# Patient Record
Sex: MALE | Race: BLACK OR AFRICAN AMERICAN | URBAN - METROPOLITAN AREA
[De-identification: names, ages, dates, MRNs, and addresses within clinical notes are randomized per-mention and may not be internally consistent; named-entity substitution may affect disease eponyms.]

---

## 2023-02-15 ENCOUNTER — OFFICE VISIT (OUTPATIENT)
Dept: URBAN - METROPOLITAN AREA CLINIC 25 | Facility: CLINIC | Age: 48
End: 2023-02-15
Payer: COMMERCIAL

## 2023-02-15 ENCOUNTER — WEB ENCOUNTER (OUTPATIENT)
Dept: URBAN - METROPOLITAN AREA CLINIC 25 | Facility: CLINIC | Age: 48
End: 2023-02-15

## 2023-02-15 VITALS
WEIGHT: 120 LBS | HEART RATE: 72 BPM | SYSTOLIC BLOOD PRESSURE: 124 MMHG | BODY MASS INDEX: 20.49 KG/M2 | DIASTOLIC BLOOD PRESSURE: 78 MMHG | HEIGHT: 64 IN | TEMPERATURE: 97.7 F

## 2023-02-15 DIAGNOSIS — Z12.11 SCREENING COLONOSCOPY: ICD-10-CM

## 2023-02-15 DIAGNOSIS — R10.13 DYSPEPSIA: ICD-10-CM

## 2023-02-15 DIAGNOSIS — K21.9 GERD: ICD-10-CM

## 2023-02-15 PROCEDURE — 99204 OFFICE O/P NEW MOD 45 MIN: CPT | Performed by: INTERNAL MEDICINE

## 2023-02-15 RX ORDER — FAMOTIDINE 40 MG/1
1 TABLET TABLET, FILM COATED ORAL TWICE A DAY
Qty: 60 | Refills: 5 | OUTPATIENT
Start: 2023-02-15

## 2023-02-15 RX ORDER — POLYETHYLENE GLYCOL 3350, SODIUM CHLORIDE, SODIUM BICARBONATE, POTASSIUM CHLORIDE 420; 11.2; 5.72; 1.48 G/4L; G/4L; G/4L; G/4L
AS DIRECTED POWDER, FOR SOLUTION ORAL ONCE
Qty: 1 BOTTLE | Refills: 0 | OUTPATIENT
Start: 2023-02-15 | End: 2023-02-16

## 2023-02-15 NOTE — HPI-TODAY'S VISIT:
This patient was referred by Dr. Sejal Reza for an evaluation of abdominal pain/ER follow-up.  A copy of this will be sent to the referring provider.  He went to [facility] ED in 11/2022 for epigastric pain.  Labs were normal.  He was prescribed PPI daily for 1 month.  He compelted this.  He still has some pain when he eats certain foods.  He denies anorexia or weight loss.  He has occasional GERD and he has a dry throat.  He denies N/V/dysphagia/early satiety.  He denies LGI symptoms.  He denies LGI bleed or melena.  There is no FHx of colon cancer.  He has not had prior colonoscopy.

## 2023-02-22 PROBLEM — 235595009 GASTROESOPHAGEAL REFLUX DISEASE: Status: ACTIVE | Noted: 2023-02-15

## 2023-03-06 ENCOUNTER — TELEPHONE ENCOUNTER (OUTPATIENT)
Dept: URBAN - METROPOLITAN AREA CLINIC 6 | Facility: CLINIC | Age: 48
End: 2023-03-06

## 2023-03-29 ENCOUNTER — OFFICE VISIT (OUTPATIENT)
Dept: URBAN - METROPOLITAN AREA MEDICAL CENTER 8 | Facility: MEDICAL CENTER | Age: 48
End: 2023-03-29

## 2023-06-28 ENCOUNTER — OFFICE VISIT (OUTPATIENT)
Dept: URBAN - METROPOLITAN AREA MEDICAL CENTER 8 | Facility: MEDICAL CENTER | Age: 48
End: 2023-06-28

## 2023-07-05 ENCOUNTER — TELEPHONE ENCOUNTER (OUTPATIENT)
Dept: URBAN - METROPOLITAN AREA CLINIC 25 | Facility: CLINIC | Age: 48
End: 2023-07-05

## 2023-07-10 ENCOUNTER — OFFICE VISIT (OUTPATIENT)
Dept: URBAN - METROPOLITAN AREA SURGERY CENTER 20 | Facility: SURGERY CENTER | Age: 48
End: 2023-07-10

## 2023-07-26 ENCOUNTER — TELEPHONE ENCOUNTER (OUTPATIENT)
Dept: URBAN - METROPOLITAN AREA CLINIC 25 | Facility: CLINIC | Age: 48
End: 2023-07-26

## 2023-08-02 ENCOUNTER — TELEPHONE ENCOUNTER (OUTPATIENT)
Dept: URBAN - METROPOLITAN AREA CLINIC 25 | Facility: CLINIC | Age: 48
End: 2023-08-02

## 2023-08-02 ENCOUNTER — OFFICE VISIT (OUTPATIENT)
Dept: URBAN - METROPOLITAN AREA MEDICAL CENTER 8 | Facility: MEDICAL CENTER | Age: 48
End: 2023-08-02
Payer: COMMERCIAL

## 2023-08-02 DIAGNOSIS — Z12.11 COLON CANCER SCREENING: ICD-10-CM

## 2023-08-02 PROCEDURE — 45378 DIAGNOSTIC COLONOSCOPY: CPT | Performed by: INTERNAL MEDICINE

## 2023-08-02 RX ORDER — FAMOTIDINE 40 MG/1
1 TABLET TABLET, FILM COATED ORAL TWICE A DAY
Qty: 60 | Refills: 5 | Status: ACTIVE | COMMUNITY
Start: 2023-02-15

## 2023-09-20 ENCOUNTER — OFFICE VISIT (OUTPATIENT)
Dept: URBAN - METROPOLITAN AREA MEDICAL CENTER 8 | Facility: MEDICAL CENTER | Age: 48
End: 2023-09-20

## 2023-09-27 ENCOUNTER — OFFICE VISIT (OUTPATIENT)
Dept: URBAN - METROPOLITAN AREA MEDICAL CENTER 8 | Facility: MEDICAL CENTER | Age: 48
End: 2023-09-27

## 2024-02-14 ENCOUNTER — OV EP (OUTPATIENT)
Dept: URBAN - METROPOLITAN AREA CLINIC 25 | Facility: CLINIC | Age: 49
End: 2024-02-14
Payer: COMMERCIAL

## 2024-02-14 VITALS
TEMPERATURE: 97.5 F | DIASTOLIC BLOOD PRESSURE: 76 MMHG | SYSTOLIC BLOOD PRESSURE: 143 MMHG | HEIGHT: 65 IN | HEART RATE: 88 BPM | WEIGHT: 116 LBS | BODY MASS INDEX: 19.33 KG/M2

## 2024-02-14 DIAGNOSIS — R10.13 EPIGASTRIC ABDOMINAL PAIN: ICD-10-CM

## 2024-02-14 DIAGNOSIS — R68.81 EARLY SATIETY: ICD-10-CM

## 2024-02-14 DIAGNOSIS — K21.9 GERD: ICD-10-CM

## 2024-02-14 PROCEDURE — 99214 OFFICE O/P EST MOD 30 MIN: CPT | Performed by: INTERNAL MEDICINE

## 2024-02-14 RX ORDER — FAMOTIDINE 40 MG/1
1 TABLET TABLET, FILM COATED ORAL TWICE A DAY
Qty: 60 | Refills: 5 | Status: DISCONTINUED | COMMUNITY
Start: 2023-02-15

## 2024-02-14 RX ORDER — PANTOPRAZOLE 40 MG/1
TABLET, DELAYED RELEASE ORAL
Qty: 60 TABLET | Status: ACTIVE | COMMUNITY

## 2024-02-14 RX ORDER — DICYCLOMINE HYDROCHLORIDE 10 MG/1
CAPSULE ORAL
Qty: 60 CAPSULE | Status: DISCONTINUED | COMMUNITY

## 2024-02-14 NOTE — HPI-TODAY'S VISIT:
Colonoscopy a year ago was normal with a 10 year recall.  He has recurrent epiagstric abdominal pain.  His pain increases with PO intake.  The pain is burning.  The pain radiates to his chest/throat.  He denies N/V/dysphagia.  He has some early satiety.  He denies anorexia or weight loss.  He denies LGI symptoms and the pain is not related to his BM's.  He denies LGI Bleed or melena.  He denies NSAID's or narcotics.  He is on Protonix with only mild response

## 2024-03-13 ENCOUNTER — EGD (OUTPATIENT)
Dept: URBAN - METROPOLITAN AREA MEDICAL CENTER 8 | Facility: MEDICAL CENTER | Age: 49
End: 2024-03-13
Payer: COMMERCIAL

## 2024-03-13 DIAGNOSIS — K29.60 ADENOPAPILLOMATOSIS GASTRICA: ICD-10-CM

## 2024-03-13 PROCEDURE — 43239 EGD BIOPSY SINGLE/MULTIPLE: CPT | Performed by: INTERNAL MEDICINE

## 2024-03-13 RX ORDER — PANTOPRAZOLE 40 MG/1
TABLET, DELAYED RELEASE ORAL
Qty: 60 TABLET | Status: ACTIVE | COMMUNITY

## 2024-03-27 ENCOUNTER — OV EP (OUTPATIENT)
Dept: URBAN - METROPOLITAN AREA CLINIC 25 | Facility: CLINIC | Age: 49
End: 2024-03-27
Payer: COMMERCIAL

## 2024-03-27 VITALS
BODY MASS INDEX: 18.99 KG/M2 | DIASTOLIC BLOOD PRESSURE: 66 MMHG | TEMPERATURE: 97.8 F | SYSTOLIC BLOOD PRESSURE: 110 MMHG | HEIGHT: 65 IN | HEART RATE: 87 BPM | WEIGHT: 114 LBS

## 2024-03-27 DIAGNOSIS — R10.13 EPIGASTRIC ABDOMINAL PAIN: ICD-10-CM

## 2024-03-27 DIAGNOSIS — R68.81 EARLY SATIETY: ICD-10-CM

## 2024-03-27 DIAGNOSIS — K21.9 GERD: ICD-10-CM

## 2024-03-27 PROCEDURE — 99213 OFFICE O/P EST LOW 20 MIN: CPT | Performed by: INTERNAL MEDICINE

## 2024-03-27 RX ORDER — DICYCLOMINE HYDROCHLORIDE 10 MG/1
CAPSULE ORAL
Qty: 60 CAPSULE | Status: DISCONTINUED | COMMUNITY

## 2024-03-27 RX ORDER — PANTOPRAZOLE 40 MG/1
TABLET, DELAYED RELEASE ORAL
Qty: 60 TABLET | Status: ACTIVE | COMMUNITY

## 2024-03-27 NOTE — HPI-TODAY'S VISIT:
EGD was overall normal with mild gastritis.  I had ordered CT Scan but patient informed us that this was already done in 12/2023.  This was reviewed.  This was normal from GI standpoint.  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  I then ordered a GES but this was not done.  He is following with Pulmonary for this.  He denies anorexia or weight loss.  He has occasional GERD.  He has some nasuea but no vomiting.  He denies early satiety.  His symptoms increase with certain foods.  He describes a burning abdominal pain.  He denies LGI symptoms.  He denies LGI Bleed or melena.  His pain did decrease with PPI therapy

## 2024-11-01 ENCOUNTER — OFFICE VISIT (OUTPATIENT)
Dept: URBAN - METROPOLITAN AREA CLINIC 25 | Facility: CLINIC | Age: 49
End: 2024-11-01

## 2024-11-13 ENCOUNTER — DASHBOARD ENCOUNTERS (OUTPATIENT)
Age: 49
End: 2024-11-13

## 2024-11-13 ENCOUNTER — OFFICE VISIT (OUTPATIENT)
Dept: URBAN - METROPOLITAN AREA CLINIC 25 | Facility: CLINIC | Age: 49
End: 2024-11-13
Payer: COMMERCIAL

## 2024-11-13 VITALS
HEIGHT: 65 IN | WEIGHT: 111.8 LBS | HEART RATE: 75 BPM | DIASTOLIC BLOOD PRESSURE: 71 MMHG | SYSTOLIC BLOOD PRESSURE: 121 MMHG | TEMPERATURE: 97.6 F | BODY MASS INDEX: 18.63 KG/M2

## 2024-11-13 DIAGNOSIS — R10.11 RIGHT UPPER QUADRANT PAIN: ICD-10-CM

## 2024-11-13 DIAGNOSIS — K82.8 GALLBLADDER SLUDGE: ICD-10-CM

## 2024-11-13 DIAGNOSIS — K21.9 GERD: ICD-10-CM

## 2024-11-13 PROCEDURE — 99214 OFFICE O/P EST MOD 30 MIN: CPT | Performed by: INTERNAL MEDICINE

## 2024-11-13 RX ORDER — PANTOPRAZOLE 40 MG/1
1 TABLET TABLET, DELAYED RELEASE ORAL ONCE A DAY
Qty: 30 TABLET | Refills: 5

## 2024-11-13 RX ORDER — SUCRALFATE 1 G/1
1 TABLET ON AN EMPTY STOMACH TABLET ORAL
Qty: 60 | Refills: 5

## 2024-11-13 RX ORDER — PANTOPRAZOLE 40 MG/1
TABLET, DELAYED RELEASE ORAL
Qty: 60 TABLET | Status: ACTIVE | COMMUNITY

## 2024-11-13 RX ORDER — SUCRALFATE 1 G/1
TABLET ORAL
Qty: 30 TABLET | Status: ACTIVE | COMMUNITY

## 2024-11-13 NOTE — PHYSICAL EXAM CARDIOVASCULAR:
Called patient and appointment changed to Friday at 8:40 am for patient. Is this ok? no edema, no murmurs, regular rate and rhythm

## 2024-11-13 NOTE — HPI-TODAY'S VISIT:
Patient last seen 3/2024.  GES done last seek was normal.  He went to the ED lat week for recurrent abdominal pain. Hospital records were reviewed in clinic today including Attending notes, imaging reports, BMP/CBC/LFT's.  Lab s and CT Scan were normal.  He feels better.  The pain was on the right side that radiated to the chest and then he had epigastric burn.  He had some nausea but vomiting.  This has improved.  He still has some epigastric burning and rpessure pain.  He is not eating well.  The pain gets worse with PO intake.  He denies early satiety.  he no longer has nausea.  He did respond to PPI in the past.  He denies LGI symptoms or bleed.  He has been following up with Pulmonary

## 2025-02-13 ENCOUNTER — OFFICE VISIT (OUTPATIENT)
Dept: URBAN - METROPOLITAN AREA CLINIC 25 | Facility: CLINIC | Age: 50
End: 2025-02-13
Payer: COMMERCIAL

## 2025-02-13 VITALS
WEIGHT: 108 LBS | HEIGHT: 65 IN | SYSTOLIC BLOOD PRESSURE: 112 MMHG | BODY MASS INDEX: 17.99 KG/M2 | HEART RATE: 80 BPM | DIASTOLIC BLOOD PRESSURE: 66 MMHG

## 2025-02-13 DIAGNOSIS — K21.9 GERD: ICD-10-CM

## 2025-02-13 PROCEDURE — 99213 OFFICE O/P EST LOW 20 MIN: CPT

## 2025-02-13 RX ORDER — PANTOPRAZOLE SODIUM 20 MG/1
1 TABLET TABLET, DELAYED RELEASE ORAL ONCE A DAY
Qty: 90 TABLET | Refills: 3 | OUTPATIENT
Start: 2025-02-13

## 2025-02-13 RX ORDER — SUCRALFATE 1 G/1
1 TABLET ON AN EMPTY STOMACH TABLET ORAL
Qty: 60 | Refills: 5 | Status: ACTIVE | COMMUNITY

## 2025-02-13 RX ORDER — PANTOPRAZOLE 40 MG/1
1 TABLET TABLET, DELAYED RELEASE ORAL ONCE A DAY
Qty: 30 TABLET | Refills: 5 | Status: ACTIVE | COMMUNITY

## 2025-02-13 NOTE — HPI-TODAY'S VISIT:
02/25 OV The patient reports he has been taking pantoprazole 40mg OD w/ good efficacy in resolving symptoms, however, over the last 10 days patient developed a cold and notes he has been having discomfort in his epigastric region when he coughs. Patient has not gotten URI w/u w/ pcp yet. Recent blood work w/ pcp wnl besides elevated cholesterol levels as per patient. No associated nausea. No postprandial pain, no diarrhea or change in bowel habits. Discomfort is worse w/ oily and spicy foods.

## 2025-02-13 NOTE — HPI-OTHER HISTORIES
11/24 OV  Patient last seen 3/2024. GES done last seek was normal. He went to the ED lat week for recurrent abdominal pain. Hospital records were reviewed in clinic today including Attending notes, imaging reports, BMP/CBC/LFT's.  Lab s and CT Scan were normal.  He feels better.  The pain was on the right side that radiated to the chest and then he had epigastric burn.  He had some nausea but vomiting.  This has improved.  He still has some epigastric burning and rpessure pain.  He is not eating well.  The pain gets worse with PO intake.  He denies early satiety.  he no longer has nausea.  He did respond to PPI in the past.  He denies LGI symptoms or bleed.  He has been following up with Pulmonary

## 2025-06-13 ENCOUNTER — OFFICE VISIT (OUTPATIENT)
Dept: URBAN - METROPOLITAN AREA CLINIC 25 | Facility: CLINIC | Age: 50
End: 2025-06-13
Payer: COMMERCIAL

## 2025-06-13 DIAGNOSIS — R14.2 ERUCTATION: ICD-10-CM

## 2025-06-13 DIAGNOSIS — K59.04 CHRONIC IDIOPATHIC CONSTIPATION: ICD-10-CM

## 2025-06-13 DIAGNOSIS — K21.9 GERD: ICD-10-CM

## 2025-06-13 PROBLEM — 82934008: Status: ACTIVE | Noted: 2025-06-13

## 2025-06-13 PROCEDURE — 99214 OFFICE O/P EST MOD 30 MIN: CPT

## 2025-06-13 RX ORDER — PANTOPRAZOLE 40 MG/1
1 TABLET TABLET, DELAYED RELEASE ORAL ONCE A DAY
Qty: 30 TABLET | Refills: 5 | Status: ACTIVE | COMMUNITY

## 2025-06-13 RX ORDER — LUBIPROSTONE 24 UG/1
1 CAPSULE WITH FOOD AND WATER CAPSULE, GELATIN COATED ORAL TWICE A DAY
Qty: 180 CAPSULE | Refills: 3 | OUTPATIENT
Start: 2025-06-13 | End: 2026-06-08

## 2025-06-13 RX ORDER — SUCRALFATE 1 G/1
1 TABLET ON AN EMPTY STOMACH TABLET ORAL
Qty: 60 | Refills: 5 | Status: ACTIVE | COMMUNITY

## 2025-06-13 RX ORDER — PANTOPRAZOLE SODIUM 20 MG/1
1 TABLET TABLET, DELAYED RELEASE ORAL ONCE A DAY
Qty: 90 TABLET | Refills: 3 | Status: ACTIVE | COMMUNITY
Start: 2025-02-13

## 2025-06-13 NOTE — HPI-OTHER HISTORIES
02/25 OV The patient reports he has been taking pantoprazole 40mg OD w/ good efficacy in resolving symptoms, however, over the last 10 days patient developed a cold and notes he has been having discomfort in his epigastric region when he coughs. Patient has not gotten URI w/u w/ pcp yet. Recent blood work w/ pcp wnl besides elevated cholesterol levels as per patient. No associated nausea. No postprandial pain, no diarrhea or change in bowel habits. Discomfort is worse w/ oily and spicy foods.   11/24 OV  Patient last seen 3/2024. GES done last seek was normal. He went to the ED lat week for recurrent abdominal pain. Hospital records were reviewed in clinic today including Attending notes, imaging reports, BMP/CBC/LFT's.  Lab s and CT Scan were normal.  He feels better.  The pain was on the right side that radiated to the chest and then he had epigastric burn.  He had some nausea but vomiting.  This has improved.  He still has some epigastric burning and rpessure pain.  He is not eating well.  The pain gets worse with PO intake.  He denies early satiety.  he no longer has nausea.  He did respond to PPI in the past.  He denies LGI symptoms or bleed.  He has been following up with Pulmonary

## 2025-06-13 NOTE — HPI-TODAY'S VISIT:
06/25 OV  Recent ER visit yesterday for worsenign abdominal disocmofrt, CT a/p revealed no acute abmormality, but CT shows fecalized contents in the small bowel loops in the lower abdomen, non specific, bacterial overgrowth vs delayed motility. A partially visualzied mucoid inpaction, recently started trelligy, following w/ pulm currently. Cough has improved as of late. Patient reports mild cosntipation, usually improved w/ prune juice, disocmfort is better w/ a BM. Heartburn is well controlled, currently taking PPI 20mg prn. No dysphagia or odynophagia. He deneis BRBPR, melena, unitnentional wt loss, SOB/CP. Normal US at ER visit.

## 2025-08-15 ENCOUNTER — OFFICE VISIT (OUTPATIENT)
Dept: URBAN - METROPOLITAN AREA CLINIC 25 | Facility: CLINIC | Age: 50
End: 2025-08-15
Payer: COMMERCIAL

## 2025-08-15 DIAGNOSIS — K59.04 CHRONIC IDIOPATHIC CONSTIPATION: ICD-10-CM

## 2025-08-15 DIAGNOSIS — K21.9 GERD: ICD-10-CM

## 2025-08-15 PROCEDURE — 99214 OFFICE O/P EST MOD 30 MIN: CPT

## 2025-08-15 RX ORDER — PANTOPRAZOLE SODIUM 20 MG/1
1 TABLET TABLET, DELAYED RELEASE ORAL ONCE A DAY
Qty: 90 TABLET | Refills: 3 | Status: ACTIVE | COMMUNITY
Start: 2025-02-13

## 2025-08-15 RX ORDER — FAMOTIDINE 40 MG/1
1 TABLET AT BEDTIME TABLET, FILM COATED ORAL ONCE A DAY
Qty: 90 TABLET | Refills: 3 | OUTPATIENT
Start: 2025-08-15

## 2025-08-15 RX ORDER — OMEPRAZOLE 40 MG/1
1 CAPSULE 30 MINUTES BEFORE MORNING MEAL CAPSULE, DELAYED RELEASE ORAL ONCE A DAY
Qty: 90 | Refills: 1 | OUTPATIENT
Start: 2025-08-15

## 2025-08-15 RX ORDER — SUCRALFATE 1 G/1
1 TABLET ON AN EMPTY STOMACH TABLET ORAL
Qty: 60 | Refills: 5 | Status: ACTIVE | COMMUNITY

## 2025-08-15 RX ORDER — PANTOPRAZOLE 40 MG/1
1 TABLET TABLET, DELAYED RELEASE ORAL ONCE A DAY
Qty: 30 TABLET | Refills: 5 | Status: ACTIVE | COMMUNITY

## 2025-08-15 RX ORDER — LUBIPROSTONE 24 UG/1
1 CAPSULE WITH FOOD AND WATER CAPSULE, GELATIN COATED ORAL TWICE A DAY
Qty: 180 CAPSULE | Refills: 3 | Status: ACTIVE | COMMUNITY
Start: 2025-06-13 | End: 2026-06-08